# Patient Record
Sex: MALE | Race: WHITE | URBAN - METROPOLITAN AREA
[De-identification: names, ages, dates, MRNs, and addresses within clinical notes are randomized per-mention and may not be internally consistent; named-entity substitution may affect disease eponyms.]

---

## 2017-04-03 PROBLEM — 275978004 SCREENING FOR MALIGNANT NEOPLASM OF COLON: Status: ACTIVE | Noted: 2017-04-03

## 2021-10-26 ENCOUNTER — OFFICE VISIT (OUTPATIENT)
Dept: URBAN - METROPOLITAN AREA CLINIC 115 | Facility: CLINIC | Age: 66
End: 2021-10-26
Payer: COMMERCIAL

## 2021-10-26 ENCOUNTER — WEB ENCOUNTER (OUTPATIENT)
Dept: URBAN - METROPOLITAN AREA CLINIC 115 | Facility: CLINIC | Age: 66
End: 2021-10-26

## 2021-10-26 ENCOUNTER — DASHBOARD ENCOUNTERS (OUTPATIENT)
Age: 66
End: 2021-10-26

## 2021-10-26 VITALS
TEMPERATURE: 98.2 F | HEIGHT: 70 IN | HEART RATE: 86 BPM | SYSTOLIC BLOOD PRESSURE: 151 MMHG | WEIGHT: 209.8 LBS | DIASTOLIC BLOOD PRESSURE: 77 MMHG | BODY MASS INDEX: 30.03 KG/M2

## 2021-10-26 DIAGNOSIS — R13.19 ESOPHAGEAL DYSPHAGIA: ICD-10-CM

## 2021-10-26 PROCEDURE — 99204 OFFICE O/P NEW MOD 45 MIN: CPT | Performed by: INTERNAL MEDICINE

## 2021-10-26 RX ORDER — OMEPRAZOLE 20 MG/1
1 CAPSULE 30 MINUTES BEFORE MORNING MEAL CAPSULE, DELAYED RELEASE ORAL ONCE A DAY
Status: ACTIVE | COMMUNITY

## 2021-10-26 NOTE — HPI-OTHER HISTORIES
dysphagia solids, in chest, intermittent, denies gerd, sx improved sig w omep 20 daily, no prior egd, no abd pain

## 2021-11-11 ENCOUNTER — OFFICE VISIT (OUTPATIENT)
Dept: URBAN - METROPOLITAN AREA SURGERY CENTER 13 | Facility: SURGERY CENTER | Age: 66
End: 2021-11-11
Payer: COMMERCIAL

## 2021-11-11 DIAGNOSIS — K22.2 ACQUIRED ESOPHAGEAL RING: ICD-10-CM

## 2021-11-11 DIAGNOSIS — K29.60 ADENOPAPILLOMATOSIS GASTRICA: ICD-10-CM

## 2021-11-11 DIAGNOSIS — R13.19 CERVICAL DYSPHAGIA: ICD-10-CM

## 2021-11-11 DIAGNOSIS — K21.00 ALKALINE REFLUX ESOPHAGITIS: ICD-10-CM

## 2021-11-11 PROCEDURE — 43239 EGD BIOPSY SINGLE/MULTIPLE: CPT | Performed by: INTERNAL MEDICINE

## 2021-11-11 PROCEDURE — G8907 PT DOC NO EVENTS ON DISCHARG: HCPCS | Performed by: INTERNAL MEDICINE

## 2021-11-11 PROCEDURE — 43248 EGD GUIDE WIRE INSERTION: CPT | Performed by: INTERNAL MEDICINE

## 2021-11-11 RX ORDER — OMEPRAZOLE 20 MG/1
1 CAPSULE 30 MINUTES BEFORE MORNING MEAL CAPSULE, DELAYED RELEASE ORAL ONCE A DAY
Status: ACTIVE | COMMUNITY

## 2021-11-15 ENCOUNTER — OFFICE VISIT (OUTPATIENT)
Dept: URBAN - METROPOLITAN AREA CLINIC 27 | Facility: CLINIC | Age: 66
End: 2021-11-15

## 2021-11-30 ENCOUNTER — TELEPHONE ENCOUNTER (OUTPATIENT)
Dept: URBAN - METROPOLITAN AREA CLINIC 92 | Facility: CLINIC | Age: 66
End: 2021-11-30

## 2021-11-30 RX ORDER — OMEPRAZOLE 40 MG/1
1 CAPSULE 30 MINUTES BEFORE MORNING MEAL CAPSULE, DELAYED RELEASE ORAL ONCE A DAY
Qty: 30 | Refills: 1

## 2021-12-29 ENCOUNTER — OFFICE VISIT (OUTPATIENT)
Dept: URBAN - METROPOLITAN AREA SURGERY CENTER 13 | Facility: SURGERY CENTER | Age: 66
End: 2021-12-29

## 2022-01-04 ENCOUNTER — OFFICE VISIT (OUTPATIENT)
Dept: URBAN - METROPOLITAN AREA CLINIC 115 | Facility: CLINIC | Age: 67
End: 2022-01-04

## 2022-04-04 ENCOUNTER — ERX REFILL RESPONSE (OUTPATIENT)
Dept: URBAN - METROPOLITAN AREA CLINIC 82 | Facility: CLINIC | Age: 67
End: 2022-04-04

## 2022-04-04 RX ORDER — OMEPRAZOLE 40 MG/1
TAKE ONE CAPSULE BY MOUTH ONE TIME DAILY IN THE MORNING 30 MINUTES BEFORE MORNING MEAL CAPSULE, DELAYED RELEASE ORAL
Qty: 30 CAPSULE | Refills: 2 | OUTPATIENT

## 2022-04-30 ENCOUNTER — TELEPHONE ENCOUNTER (OUTPATIENT)
Dept: URBAN - METROPOLITAN AREA CLINIC 121 | Facility: CLINIC | Age: 67
End: 2022-04-30

## 2022-05-01 ENCOUNTER — TELEPHONE ENCOUNTER (OUTPATIENT)
Dept: URBAN - METROPOLITAN AREA CLINIC 121 | Facility: CLINIC | Age: 67
End: 2022-05-01

## 2022-05-01 RX ORDER — CETIRIZINE HYDROCHLORIDE 10 MG/1
TABLET, FILM COATED ORAL
Status: ACTIVE | COMMUNITY
Start: 2017-03-13

## 2022-05-01 RX ORDER — PRAVASTATIN SODIUM 20 MG/1
TABLET ORAL
Status: ACTIVE | COMMUNITY
Start: 2017-03-13

## 2023-05-23 ENCOUNTER — HOSPITAL ENCOUNTER (INPATIENT)
Facility: CLINIC | Age: 68
LOS: 2 days | Discharge: HOME | End: 2023-05-25
Attending: EMERGENCY MEDICINE | Admitting: INTERNAL MEDICINE
Payer: COMMERCIAL

## 2023-05-23 ENCOUNTER — APPOINTMENT (OUTPATIENT)
Dept: ULTRASOUND IMAGING | Facility: CLINIC | Age: 68
End: 2023-05-23
Attending: EMERGENCY MEDICINE
Payer: COMMERCIAL

## 2023-05-23 DIAGNOSIS — L03.114 CELLULITIS OF LEFT HAND: ICD-10-CM

## 2023-05-23 DIAGNOSIS — R21 RASH AND NONSPECIFIC SKIN ERUPTION: ICD-10-CM

## 2023-05-23 DIAGNOSIS — T23.202A: ICD-10-CM

## 2023-05-23 DIAGNOSIS — L40.9 PSORIASIS: Primary | ICD-10-CM

## 2023-05-23 DIAGNOSIS — L03.116 CELLULITIS OF LEFT LEG: ICD-10-CM

## 2023-05-23 LAB
ALBUMIN SERPL BCG-MCNC: 4 G/DL (ref 3.5–5.2)
ALP SERPL-CCNC: 86 U/L (ref 40–129)
ALT SERPL W P-5'-P-CCNC: 20 U/L (ref 10–50)
ANION GAP SERPL CALCULATED.3IONS-SCNC: 12 MMOL/L (ref 7–15)
AST SERPL W P-5'-P-CCNC: 22 U/L (ref 10–50)
BASOPHILS # BLD AUTO: 0.1 10E3/UL (ref 0–0.2)
BASOPHILS NFR BLD AUTO: 1 %
BILIRUB DIRECT SERPL-MCNC: <0.2 MG/DL (ref 0–0.3)
BILIRUB SERPL-MCNC: 0.3 MG/DL
BUN SERPL-MCNC: 20.1 MG/DL (ref 8–23)
CALCIUM SERPL-MCNC: 8.8 MG/DL (ref 8.8–10.2)
CHLORIDE SERPL-SCNC: 102 MMOL/L (ref 98–107)
CREAT SERPL-MCNC: 1.04 MG/DL (ref 0.67–1.17)
DEPRECATED HCO3 PLAS-SCNC: 22 MMOL/L (ref 22–29)
EOSINOPHIL # BLD AUTO: 0.2 10E3/UL (ref 0–0.7)
EOSINOPHIL NFR BLD AUTO: 2 %
ERYTHROCYTE [DISTWIDTH] IN BLOOD BY AUTOMATED COUNT: 15.3 % (ref 10–15)
GFR SERPL CREATININE-BSD FRML MDRD: 79 ML/MIN/1.73M2
GLUCOSE SERPL-MCNC: 176 MG/DL (ref 70–99)
HCT VFR BLD AUTO: 42.9 % (ref 40–53)
HGB BLD-MCNC: 14.2 G/DL (ref 13.3–17.7)
HOLD SPECIMEN: NORMAL
IMM GRANULOCYTES # BLD: 0.1 10E3/UL
IMM GRANULOCYTES NFR BLD: 1 %
LYMPHOCYTES # BLD AUTO: 1.8 10E3/UL (ref 0.8–5.3)
LYMPHOCYTES NFR BLD AUTO: 15 %
MCH RBC QN AUTO: 29.5 PG (ref 26.5–33)
MCHC RBC AUTO-ENTMCNC: 33.1 G/DL (ref 31.5–36.5)
MCV RBC AUTO: 89 FL (ref 78–100)
MONOCYTES # BLD AUTO: 0.5 10E3/UL (ref 0–1.3)
MONOCYTES NFR BLD AUTO: 5 %
NEUTROPHILS # BLD AUTO: 9 10E3/UL (ref 1.6–8.3)
NEUTROPHILS NFR BLD AUTO: 76 %
NRBC # BLD AUTO: 0 10E3/UL
NRBC BLD AUTO-RTO: 0 /100
PLATELET # BLD AUTO: 422 10E3/UL (ref 150–450)
POTASSIUM SERPL-SCNC: 4.1 MMOL/L (ref 3.4–5.3)
PROT SERPL-MCNC: 7 G/DL (ref 6.4–8.3)
RBC # BLD AUTO: 4.82 10E6/UL (ref 4.4–5.9)
SODIUM SERPL-SCNC: 136 MMOL/L (ref 136–145)
WBC # BLD AUTO: 11.6 10E3/UL (ref 4–11)

## 2023-05-23 PROCEDURE — 250N000011 HC RX IP 250 OP 636: Performed by: INTERNAL MEDICINE

## 2023-05-23 PROCEDURE — 250N000013 HC RX MED GY IP 250 OP 250 PS 637: Performed by: INTERNAL MEDICINE

## 2023-05-23 PROCEDURE — 85025 COMPLETE CBC W/AUTO DIFF WBC: CPT | Performed by: EMERGENCY MEDICINE

## 2023-05-23 PROCEDURE — 99285 EMERGENCY DEPT VISIT HI MDM: CPT | Mod: 25

## 2023-05-23 PROCEDURE — 82248 BILIRUBIN DIRECT: CPT | Performed by: EMERGENCY MEDICINE

## 2023-05-23 PROCEDURE — 87040 BLOOD CULTURE FOR BACTERIA: CPT | Performed by: EMERGENCY MEDICINE

## 2023-05-23 PROCEDURE — 80053 COMPREHEN METABOLIC PANEL: CPT | Performed by: EMERGENCY MEDICINE

## 2023-05-23 PROCEDURE — 99222 1ST HOSP IP/OBS MODERATE 55: CPT | Performed by: INTERNAL MEDICINE

## 2023-05-23 PROCEDURE — 120N000001 HC R&B MED SURG/OB

## 2023-05-23 PROCEDURE — 93971 EXTREMITY STUDY: CPT | Mod: LT

## 2023-05-23 PROCEDURE — 80048 BASIC METABOLIC PNL TOTAL CA: CPT | Performed by: EMERGENCY MEDICINE

## 2023-05-23 PROCEDURE — 250N000011 HC RX IP 250 OP 636: Performed by: EMERGENCY MEDICINE

## 2023-05-23 PROCEDURE — 36415 COLL VENOUS BLD VENIPUNCTURE: CPT | Performed by: EMERGENCY MEDICINE

## 2023-05-23 PROCEDURE — 258N000003 HC RX IP 258 OP 636: Performed by: EMERGENCY MEDICINE

## 2023-05-23 RX ORDER — POLYETHYLENE GLYCOL 3350 17 G/17G
17 POWDER, FOR SOLUTION ORAL DAILY PRN
Status: DISCONTINUED | OUTPATIENT
Start: 2023-05-23 | End: 2023-05-25 | Stop reason: HOSPADM

## 2023-05-23 RX ORDER — POLYETHYLENE GLYCOL 3350 17 G
2 POWDER IN PACKET (EA) ORAL
Status: DISCONTINUED | OUTPATIENT
Start: 2023-05-23 | End: 2023-05-25 | Stop reason: HOSPADM

## 2023-05-23 RX ORDER — CLINDAMYCIN PHOSPHATE 900 MG/50ML
900 INJECTION, SOLUTION INTRAVENOUS ONCE
Status: COMPLETED | OUTPATIENT
Start: 2023-05-23 | End: 2023-05-23

## 2023-05-23 RX ORDER — AMOXICILLIN 250 MG
2 CAPSULE ORAL 2 TIMES DAILY PRN
Status: DISCONTINUED | OUTPATIENT
Start: 2023-05-23 | End: 2023-05-25 | Stop reason: HOSPADM

## 2023-05-23 RX ORDER — SODIUM CHLORIDE 9 MG/ML
INJECTION, SOLUTION INTRAVENOUS CONTINUOUS
Status: DISCONTINUED | OUTPATIENT
Start: 2023-05-23 | End: 2023-05-23

## 2023-05-23 RX ORDER — CLINDAMYCIN PHOSPHATE 900 MG/50ML
900 INJECTION, SOLUTION INTRAVENOUS EVERY 8 HOURS
Status: DISCONTINUED | OUTPATIENT
Start: 2023-05-24 | End: 2023-05-24

## 2023-05-23 RX ORDER — ENOXAPARIN SODIUM 100 MG/ML
30 INJECTION SUBCUTANEOUS EVERY 24 HOURS
Status: DISCONTINUED | OUTPATIENT
Start: 2023-05-23 | End: 2023-05-24

## 2023-05-23 RX ORDER — KETOCONAZOLE 20 MG/G
CREAM TOPICAL 2 TIMES DAILY
Status: ON HOLD | COMMUNITY
End: 2023-05-24

## 2023-05-23 RX ORDER — LIDOCAINE 40 MG/G
CREAM TOPICAL
Status: DISCONTINUED | OUTPATIENT
Start: 2023-05-23 | End: 2023-05-25 | Stop reason: HOSPADM

## 2023-05-23 RX ORDER — AMOXICILLIN 250 MG
1 CAPSULE ORAL 2 TIMES DAILY PRN
Status: DISCONTINUED | OUTPATIENT
Start: 2023-05-23 | End: 2023-05-25 | Stop reason: HOSPADM

## 2023-05-23 RX ORDER — SULFAMETHOXAZOLE AND TRIMETHOPRIM 400; 80 MG/1; MG/1
1 TABLET ORAL 2 TIMES DAILY
Status: ON HOLD | COMMUNITY
End: 2023-05-24

## 2023-05-23 RX ADMIN — CLINDAMYCIN PHOSPHATE 900 MG: 900 INJECTION, SOLUTION INTRAVENOUS at 16:30

## 2023-05-23 RX ADMIN — CLINDAMYCIN PHOSPHATE 900 MG: 900 INJECTION, SOLUTION INTRAVENOUS at 23:42

## 2023-05-23 RX ADMIN — MICONAZOLE NITRATE: 20 POWDER TOPICAL at 20:32

## 2023-05-23 RX ADMIN — ENOXAPARIN SODIUM 30 MG: 30 INJECTION SUBCUTANEOUS at 19:26

## 2023-05-23 RX ADMIN — SODIUM CHLORIDE 1000 ML: 9 INJECTION, SOLUTION INTRAVENOUS at 16:30

## 2023-05-23 ASSESSMENT — ACTIVITIES OF DAILY LIVING (ADL)
ADLS_ACUITY_SCORE: 35
ADLS_ACUITY_SCORE: 32
ADLS_ACUITY_SCORE: 28
ADLS_ACUITY_SCORE: 29

## 2023-05-23 NOTE — ED NOTES
[facility]  ED Nurse Handoff Report    ED Chief complaint: Cellulitis  . ED Diagnosis:   Final diagnoses:   Cellulitis of left hand   Cellulitis of left leg   Rash and nonspecific skin eruption       Allergies: No Known Allergies    Code Status: Full Code    Activity level - Baseline/Home:  independent.  Activity Level - Current:   standby.   Lift room needed: No.   Bariatric: No   Needed: No   Isolation: Yes.   Infection: Other-contact- rash.     Respiratory status: Room air    Vital Signs (within 30 minutes):   Vitals:    05/23/23 1244 05/23/23 1633   BP: 126/86 136/80   Pulse: 100 64   Resp: 16 18   Temp: 97.6  F (36.4  C)    TempSrc: Temporal    SpO2: 96% 94%       Cardiac Rhythm:  ,      Pain level:    Patient confused: No.   Patient Falls Risk: patient and family education.   Elimination Status: Has voided     Patient Report - Initial Complaint: cellulitis.   Focused Assessment:  Geovany Cano is a 67 year old male who presents with cellulitis. He also complains of a rash to his left lower leg that developed around one week ago. He states that around the same time he developed several spots on his right lower leg. He also reports that he recently suffered a burn to the dorsal aspect of his left hand. He has applied ointments to his left hand. He reports that he was evaluated six days ago and was prescribed Bactrim which he has been taking. He denies any history of DVT, PE, or psoriasis. He states that he drinks one beer per month and no other alcoholic beverages.     Independent Historian:   None - Patient Only      Medications:    Ketoconazole 2% topical cream  Trimethoprim-sulfamethoxazole     Past Medical History:    Neck mass     Past Surgical History:    Lymph node biopsy, neck, bilateral  Eye surgery     Physical Exam      Patient Vitals for the past 24 hrs:    BP Temp Temp src Pulse Resp SpO2   05/23/23 1244 126/86 97.6  F (36.4  C) Temporal 100 16 96 %         Physical  Exam  Constitutional: Patient is well appearing. No distress.  Head: Atraumatic.  Mouth/Throat: Oropharynx is clear and moist. No oropharyngeal exudate.  Eyes: Conjunctivae and EOM are normal. No scleral icterus.  Neck: Normal range of motion. Neck supple.   Cardiovascular: Normal rate, regular rhythm, normal heart sounds and intact distal perfusion.   Pulmonary/Chest: Breath sounds normal. No respiratory distress.  Abdominal: Soft. Bowel sounds are normal. No distension. No tenderness. No rebound or guarding.   Musculoskeletal: Normal range of motion. No edema or tenderness. All joints with free and painless range of motion.  Neurological: Alert and orientated to person, place, and time. No observable focal neuro deficit  Skin: Left lower leg from the knee down diffuse erythematous skin with desquamation and cracking. Left hand rash at the knuckles with swelling of the dorsum of hand. Left hand cracking at knuckles with swelling of dorsum of hand. Warm and dry. No rash noted. Not diaphoretic.     Abnormal Results:   Labs Ordered and Resulted from Time of ED Arrival to Time of ED Departure   BASIC METABOLIC PANEL - Abnormal       Result Value    Sodium 136      Potassium 4.1      Chloride 102      Carbon Dioxide (CO2) 22      Anion Gap 12      Urea Nitrogen 20.1      Creatinine 1.04      Calcium 8.8      Glucose 176 (*)     GFR Estimate 79     CBC WITH PLATELETS AND DIFFERENTIAL - Abnormal    WBC Count 11.6 (*)     RBC Count 4.82      Hemoglobin 14.2      Hematocrit 42.9      MCV 89      MCH 29.5      MCHC 33.1      RDW 15.3 (*)     Platelet Count 422      % Neutrophils 76      % Lymphocytes 15      % Monocytes 5      % Eosinophils 2      % Basophils 1      % Immature Granulocytes 1      NRBCs per 100 WBC 0      Absolute Neutrophils 9.0 (*)     Absolute Lymphocytes 1.8      Absolute Monocytes 0.5      Absolute Eosinophils 0.2      Absolute Basophils 0.1      Absolute Immature Granulocytes 0.1      Absolute NRBCs 0.0      HEPATIC FUNCTION PANEL - Normal    Protein Total 7.0      Albumin 4.0      Bilirubin Total 0.3      Alkaline Phosphatase 86      AST 22      ALT 20      Bilirubin Direct <0.20     BLOOD CULTURE        US Lower Extremity Venous Duplex Left    (Results Pending)       Treatments provided: IV abx, IVF  Family Comments: wife at bedside  OBS brochure/video discussed/provided to patient:  Yes  ED Medications:   Medications   clindamycin (CLEOCIN) 900 mg in 50 mL D5W intermittent infusion (900 mg Intravenous $New Bag 5/23/23 1630)   0.9% sodium chloride BOLUS (1,000 mLs Intravenous $New Bag 5/23/23 1630)     Followed by   sodium chloride 0.9% infusion (has no administration in time range)       Drips infusing:  Yes  For the majority of the shift this patient was Green.   Interventions performed were NA.    Sepsis treatment initiated: No    Cares/treatment/interventions/medications to be completed following ED care: NA    ED Nurse Name: Татьяна Flores RN  4:41 PM  RECEIVING UNIT ED HANDOFF REVIEW    Above ED Nurse Handoff Report was reviewed: Yes  Reviewed by: Theresa Forte RN on May 23, 2023 at 5:22 PM

## 2023-05-23 NOTE — H&P
[facility]    History and Physical - Hospitalist Service       Date of Admission:  5/23/2023  Primary Care Physician   No primary care provider on file.  [facility]  CONSULTANTS: None    Assessment & Plan      Geovany Cano is a 67 year old male admitted on 5/23/2023 for worsening swelling and cellulitis of the left hand, left leg, with a diffuse macular papular rash.    1.  Cellulitis:   Patient injured his left hand pouring coffee on it leading to a burn and now has developed cellulitis of the left hand with cracking of the skin, swelling, and redness.  He also has desquamation, redness, tenderness, and swelling of the left lower extremity.  He is already being treated with a few days of Keflex which was transitioned into Bactrim over the last 3 days.  Symptoms have gotten worse so he has been started on IV clindamycin which I will continue.  He also has an ultrasound pending of the left lower extremity to rule out DVT and fluid collection.  May need to consider adding IV vancomycin to cover MRSA.  I did outline redness of his lower extremity.  If not improving should consider getting a biopsy.    2.  Diffuse macular papular rash: Patient has a rash over his entire body which actually started prior to him seeing his clinic a week ago.  So I do not think that this is related to his antibiotics and I doubt that this is a allergic reaction.  He denies taking any other medications, herbs, or supplements.  Does appear to be at different stages of healing but I do not see any evidence of vesicles.  This could represent Guttate Psoriasis though would be uncommon in an adult.  Patient's been started on IV antibiotics.  I still think we should consider possibly doing a skin biopsy.  We will leave this up to the day rounding team as if the patient is doing better may not need it.    3.  Intertrigo: Patient has been on ketoconazole cream at home for the last week.  At this point I  will start the patient on Miconazole powder as well to make sure the area stays dry and not moist.  Could consider starting the patient on Diflucan if not improving.      4.  Tobacco dependence: Discussed with the patient cessation of smoking.  His wife also smokes.  He smokes a pack a day.  We will start him on nicotine gum as needed.    4Ms:  Polypharmacy: Not on any medications chronically  Mentation:   Capacity intact making his own medical decisions  Social support: Patient is in his second marriage for the last 27 years has 2 biological children and 2 stepchildren.  Works as a manager at a truck spot.  Mobility: Is not use any walkers or canes  What is importmant: To be as independent as possible.  Does not like taking medications.  Has concerns about his longevity due to his family history.  Not want a go through what his family members went through in the past    Discussed plan of care with wife at bedside, Dr. Kaplan in the emergency room  Diet:  Regular  DVT Prophylaxis: Enoxaparin (Lovenox) SQ  Haynes Catheter: Not present  Lines: None     Cardiac Monitoring: None    RESTRAINTS: Not indicated  Code Status:  Full        65  MINUTES SPENT BY ME on the date of service doing chart review, history, exam, documentation & further activities per the note.          This document was created using voice recognition technology.  Please excuse any typographical errors that may have occurred.  Please call with any questions.        Disposition Plan Patient is a started IV antibiotics with the hope that his cellulitis improves.  However the patient may require general surgery consultation for possible skin biopsy     Expected Discharge Date: 05/26/2023                  Mikey Rizvi MD  Hospitalist Service  [facility]  Securely message with [employer] (more info)  Text page via [employer] Paging/Directory     Length of stay: Anticipate greater than 2 midnight hospitalization for evaluation and  treatment of severe cellulitis requiring IV antibiotics.          ______________________________________________________________________    Chief Complaint   Left leg and left hand redness and swelling    History is obtained from the patient with his wife bedside    History of Present Illness   Geovany Cano is a 67 year old male admitted on 5/23/2023. He is not on any chronic medications and has not been diagnosed with any chronic medical conditions.  He was first seen in clinic 1 week ago after developing left lower leg redness and swelling along with the left hand on infection as well.  Patient also had a start of the diffuse macular papular rash over his entire body with his soles spared.  He has no history of rashes before.  He was started on Keflex originally and then transition to Bactrim for possible cellulitis of his left lower leg as well of his left hand.  His left hand injury started as a burn.  There is concern that the patient could have guttate psoriasis.  In 2021 the patient was worked up for cervical lymphadenopathy and had a battery of antibodies/antigen sent.  He was negative for ANCA, ESTRELLA, double-stranded DNA, Ro, Dayna.  Patient states that his swelling and tenderness has gotten worse so came to the emergency room.  He states that he has some tenderness with walking.  Has been working as a manager.  Denies any fever, chills, sweats, no nausea, vomiting.  Has no shortness of breath.  States that his body rash is actually itchy.  Cannot tell me if he ever had the chickenpox.  Thinks he had the measles as a child.      Past Medical History    Past Medical History:   Diagnosis Date     Cervical lymphadenopathy     LN biopsy and escision negative,ruled out cancer       Past Surgical History   Past Surgical History:   Procedure Laterality Date     IR LYMPH NODE BIOPSY  4/1/2021       Prior to Admission Medications   Prior to Admission Medications   Prescriptions Last Dose Informant Patient Reported?  Taking?   ketoconazole (NIZORAL) 2 % external cream Unknown  Yes Yes   Sig: Apply topically 2 times daily To arm pits   sulfamethoxazole-trimethoprim (BACTRIM) 400-80 MG tablet 2023  Yes Yes   Sig: Take 1 tablet by mouth 2 times daily      Facility-Administered Medications: None        Social History   I have reviewed this patient's social history and updated it with pertinent information if needed.  Social History     Tobacco Use     Smoking status: Every Day     Packs/day: 1.00     Types: Cigarettes   Substance Use Topics     Alcohol use: Yes       Family History   I have reviewed this patient's family history and updated it with pertinent information if needed.  Family History   Problem Relation Age of Onset     Lung Cancer Mother          60s     Chronic Obstructive Pulmonary Disease Father      Lung Cancer Brother        Allergies   No Known Allergies     REVIEW OF SYSTEMS:  A comprehensive review of systems was negative except for items noted in the HPI.  Patient currently denies any fever, chills, sweats, nausea, vomiting, diarrhea, shortness of breath, or chest pain.   Patient's rash is itchy.      Physical Exam   Vital Signs: Temp: 97.6  F (36.4  C) Temp src: Temporal BP: 136/80 Pulse: 64   Resp: 18 SpO2: 94 %      Weight: 0 lbs 0 oz    General appearance: Patient is alert and oriented x3, no apparent distress, pleasant and conversing normally, speaking in full sentences, appears stated age  HEENT:  Atraumatic/normal cephalic, EOMI, Pupils equally round and reactive to light, sclera non-icteric, Mucous membranes are moist  NECK:  supple without bruit or lymphadenopathy  RESPIRATORY: Clear to auscultation bilateral, good air movement  CARDIOVASCULAR: Regular rate and rhythm, normal S1/S2, no murmurs, rubs, or gallops present, peripheral pulses intact  GASTROINTESTINAL: Non-distended, non-tender, soft, bowel sounds present throughout, no mass or hepatosplenomegaly  MUSCULOSKELETAL: without deformity,  normal range of motion  SKIN:  Warm, dry, has a diffuse macular papular rash at different stages of healing over his entire body with his face spared as well as his heels, has desquamation of his left lower leg as well as his left hand, has intertrigo of both armpits  NEUROLOGIC:  Cranial nerves II-XII intact, without any focal deficits, strength 5/5 throughout  EXTREMITIES:  Moves all extremities, no clubbing, cyanosis, has swelling of both his left lower extremity as well as his left hand associated with some redness, tenderness, and desquamation  :  Ivy not present         Data     I have personally reviewed the following data over the past 24 hrs:    11.6 (H)  \   14.2   / 422     136 102 20.1 /  176 (H)   4.1 22 1.04 \       ALT: 20 AST: 22 AP: 86 TBILI: 0.3   ALB: 4.0 TOT PROTEIN: 7.0 LIPASE: N/A       Imaging:   No results found for this or any previous visit.  Procedures: Venous ultrasound of the left lower extremity pending 5/23/2023    I have personally have reviewed the patient's most up to date rlabs, orders, and medications myself

## 2023-05-23 NOTE — ED TRIAGE NOTES
Patient sent to the ED from clinic with worsening cellulitis. Patient states he was seen at clinic last Tuesday for cellulitis of left leg and left hand. Started on antibiotics. Antibiotics were changed Friday following the culture results. Patient states redness has continued to spread.

## 2023-05-23 NOTE — ED PROVIDER NOTES
"  History     Chief Complaint:  Cellulitis       The history is provided by the patient.      Geovany Cano is a 67 year old male who presents with cellulitis. He also complains of a rash to his left lower leg that developed around one week ago. He states that around the same time he developed several spots on his right lower leg. He also reports that he recently suffered a burn to the dorsal aspect of his left hand. He has applied ointments to his left hand. He reports that he was evaluated six days ago and was prescribed Bactrim which he has been taking. He denies any history of DVT, PE, or psoriasis. He states that he drinks one beer per month and no other alcoholic beverages.    Independent Historian:   None - Patient Only    Review of External Notes:       Medications:    Ketoconazole 2% topical cream  Trimethoprim-sulfamethoxazole    Past Medical History:    Neck mass    Past Surgical History:    Lymph node biopsy, neck, bilateral  Eye surgery     Physical Exam     Patient Vitals for the past 24 hrs:   BP Temp Temp src Pulse Resp SpO2 Height Weight   05/23/23 1807 135/78 97.8  F (36.6  C) Oral 82 16 97 % 1.753 m (5' 9\") 88.7 kg (195 lb 8.8 oz)   05/23/23 1633 136/80 -- -- 64 18 94 % -- --   05/23/23 1244 126/86 97.6  F (36.4  C) Temporal 100 16 96 % -- --        Physical Exam  Constitutional: Patient is well appearing. No distress. Not toxic  Head: Atraumatic.  Mouth/Throat: Oropharynx is clear and moist. No oropharyngeal exudate.   Eyes: Conjunctivae and EOM are normal. No scleral icterus.  Neck: Normal range of motion. Neck supple.   Cardiovascular: Normal rate, regular rhythm, normal heart sounds and intact distal perfusion.   Pulmonary/Chest: Breath sounds normal. No respiratory distress.  Abdominal: Soft. Bowel sounds are normal. No distension. No tenderness. No rebound or guarding.   Musculoskeletal: Normal range of motion. No edema or tenderness. All joints with free and painless range of " motion.  Neurological: Alert and orientated to person, place, and time. No observable focal neuro deficit  Skin: Left lower leg from the knee down diffuse erythematous skin with desquamation and cracking. Left hand rash at the knuckles with swelling of the dorsum of hand. Left hand cracking at knuckles with swelling of dorsum of hand. Warm and dry. No rash noted. Not diaphoretic.     Emergency Department Course     Imaging:  US Lower Extremity Venous Duplex Left    (Results Pending)      Report per radiology    Laboratory:  Labs Ordered and Resulted from Time of ED Arrival to Time of ED Departure   BASIC METABOLIC PANEL - Abnormal       Result Value    Sodium 136      Potassium 4.1      Chloride 102      Carbon Dioxide (CO2) 22      Anion Gap 12      Urea Nitrogen 20.1      Creatinine 1.04      Calcium 8.8      Glucose 176 (*)     GFR Estimate 79     CBC WITH PLATELETS AND DIFFERENTIAL - Abnormal    WBC Count 11.6 (*)     RBC Count 4.82      Hemoglobin 14.2      Hematocrit 42.9      MCV 89      MCH 29.5      MCHC 33.1      RDW 15.3 (*)     Platelet Count 422      % Neutrophils 76      % Lymphocytes 15      % Monocytes 5      % Eosinophils 2      % Basophils 1      % Immature Granulocytes 1      NRBCs per 100 WBC 0      Absolute Neutrophils 9.0 (*)     Absolute Lymphocytes 1.8      Absolute Monocytes 0.5      Absolute Eosinophils 0.2      Absolute Basophils 0.1      Absolute Immature Granulocytes 0.1      Absolute NRBCs 0.0     HEPATIC FUNCTION PANEL - Normal    Protein Total 7.0      Albumin 4.0      Bilirubin Total 0.3      Alkaline Phosphatase 86      AST 22      ALT 20      Bilirubin Direct <0.20     BLOOD CULTURE      Emergency Department Course & Assessments:    Interventions:  Medications   clindamycin (CLEOCIN) 900 mg in 50 mL D5W intermittent infusion (has no administration in time range)   lidocaine 1 % 0.1-1 mL (has no administration in time range)   lidocaine (LMX4) cream (has no administration in time  range)   sodium chloride (PF) 0.9% PF flush 3 mL (3 mLs Intracatheter $Given 5/23/23 1841)   sodium chloride (PF) 0.9% PF flush 3 mL (has no administration in time range)   melatonin tablet 1 mg (has no administration in time range)   enoxaparin ANTICOAGULANT (LOVENOX) injection 30 mg (30 mg Subcutaneous $Given 5/23/23 1926)   senna-docusate (SENOKOT-S/PERICOLACE) 8.6-50 MG per tablet 1 tablet (has no administration in time range)     Or   senna-docusate (SENOKOT-S/PERICOLACE) 8.6-50 MG per tablet 2 tablet (has no administration in time range)   polyethylene glycol (MIRALAX) Packet 17 g (has no administration in time range)   nicotine (COMMIT) lozenge 2 mg (has no administration in time range)   miconazole (MICATIN) 2 % powder ( Topical $Given 5/23/23 2032)   clindamycin (CLEOCIN) 900 mg in 50 mL D5W intermittent infusion (900 mg Intravenous $New Bag 5/23/23 1630)   0.9% sodium chloride BOLUS (1,000 mLs Intravenous $New Bag 5/23/23 1630)        Assessments:  1535 I obtained history and examined the patient.    Independent Interpretation (X-rays, CTs, rhythm strip):  None    Consultations/Discussion of Management or Tests:  1620 I spoke on the phone with Dr. Mikey Rizvi, Hospitalist.     Social Determinants of Health affecting care:       Disposition:  The patient was admitted to the hospital under the care of Dr. Mikey Rizvi.     Impression & Plan      Medical Decision Making:  Geovany Cano is a 67 year old male who presents for evaluation of skin redness. The history, physical exam and supporting data are consistent with cellulitis. There do not appear at this time to be any complication of cellulitis including necrotizing fascitis, lymphangitis, lymphadenitis, abscess, osteomyelitis, sepsis, or shock. The patient is not immunosuppressed. Patient has failed outpatient management. Laboratory studies today show mildly elevated white blood cell count. Patient is started on IV antibiotics as  above. Plan will be to admit patient to the hospitalist for further IV therapy and evaluation.    Diagnosis:    ICD-10-CM    1. Cellulitis of left hand  L03.114       2. Cellulitis of left leg  L03.116       3. Rash and nonspecific skin eruption  R21            Scribe Disclosure:  Lisa GAMA B.S. am serving as a scribe at 3:39 PM on 5/23/2023 to document services personally performed by Michael Kaplan MD based on my observations and the provider's statements to me.     Due to hospital and departmental capacity constraints and prolonged wait times, this patient was evaluated in non-traditional circumstances such as in triage/waiting room, a hallway, etc. I explained the option to wait for a traditional treatment space and apologized for the inconvenience. Given the circumstances, every attempt was made to provide for the patient's comfort and privacy and to perform the most thorough evaluation possible.       Michael Kaplan MD  05/23/23 1147

## 2023-05-23 NOTE — PHARMACY-ADMISSION MEDICATION HISTORY
Pharmacist Admission Medication History    Admission medication history is complete. The information provided in this note is only as accurate as the sources available at the time of the update.    Medication reconciliation/reorder completed by provider prior to medication history? No    Information Source(s): Patient and Clinic records via in-person    Pertinent Information:     Changes made to PTA medication list:    Added: None    Deleted: None    Changed: None    Medication Affordability:       Allergies reviewed with patient and updates made in EHR: yes    Medication History Completed By: Mira Vann RPH 5/23/2023 5:18 PM    Prior to Admission medications    Medication Sig Last Dose Taking? Auth Provider Long Term End Date   ketoconazole (NIZORAL) 2 % external cream Apply topically 2 times daily To arm pits Unknown Yes Reported, Patient     sulfamethoxazole-trimethoprim (BACTRIM) 400-80 MG tablet Take 1 tablet by mouth 2 times daily 5/23/2023 Yes Reported, Patient

## 2023-05-24 LAB
BASOPHILS # BLD AUTO: 0.1 10E3/UL (ref 0–0.2)
BASOPHILS NFR BLD AUTO: 1 %
EOSINOPHIL # BLD AUTO: 0.3 10E3/UL (ref 0–0.7)
EOSINOPHIL NFR BLD AUTO: 4 %
ERYTHROCYTE [DISTWIDTH] IN BLOOD BY AUTOMATED COUNT: 15.2 % (ref 10–15)
HCT VFR BLD AUTO: 39.8 % (ref 40–53)
HGB BLD-MCNC: 13.5 G/DL (ref 13.3–17.7)
IMM GRANULOCYTES # BLD: 0.1 10E3/UL
IMM GRANULOCYTES NFR BLD: 1 %
LYMPHOCYTES # BLD AUTO: 1.8 10E3/UL (ref 0.8–5.3)
LYMPHOCYTES NFR BLD AUTO: 20 %
MCH RBC QN AUTO: 29.7 PG (ref 26.5–33)
MCHC RBC AUTO-ENTMCNC: 33.9 G/DL (ref 31.5–36.5)
MCV RBC AUTO: 88 FL (ref 78–100)
MONOCYTES # BLD AUTO: 0.7 10E3/UL (ref 0–1.3)
MONOCYTES NFR BLD AUTO: 8 %
NEUTROPHILS # BLD AUTO: 6.2 10E3/UL (ref 1.6–8.3)
NEUTROPHILS NFR BLD AUTO: 66 %
NRBC # BLD AUTO: 0 10E3/UL
NRBC BLD AUTO-RTO: 0 /100
PLATELET # BLD AUTO: 390 10E3/UL (ref 150–450)
RBC # BLD AUTO: 4.54 10E6/UL (ref 4.4–5.9)
WBC # BLD AUTO: 9.2 10E3/UL (ref 4–11)

## 2023-05-24 PROCEDURE — 99233 SBSQ HOSP IP/OBS HIGH 50: CPT | Performed by: HOSPITALIST

## 2023-05-24 PROCEDURE — 85025 COMPLETE CBC W/AUTO DIFF WBC: CPT | Performed by: HOSPITALIST

## 2023-05-24 PROCEDURE — G0463 HOSPITAL OUTPT CLINIC VISIT: HCPCS

## 2023-05-24 PROCEDURE — 120N000001 HC R&B MED SURG/OB

## 2023-05-24 PROCEDURE — 86060 ANTISTREPTOLYSIN O TITER: CPT | Performed by: HOSPITALIST

## 2023-05-24 PROCEDURE — 250N000011 HC RX IP 250 OP 636: Performed by: HOSPITALIST

## 2023-05-24 PROCEDURE — 36415 COLL VENOUS BLD VENIPUNCTURE: CPT | Performed by: HOSPITALIST

## 2023-05-24 PROCEDURE — 250N000011 HC RX IP 250 OP 636

## 2023-05-24 PROCEDURE — 250N000011 HC RX IP 250 OP 636: Performed by: INTERNAL MEDICINE

## 2023-05-24 PROCEDURE — 250N000013 HC RX MED GY IP 250 OP 250 PS 637: Performed by: INTERNAL MEDICINE

## 2023-05-24 RX ORDER — DESONIDE 0.5 MG/G
OINTMENT TOPICAL 2 TIMES DAILY
Status: DISCONTINUED | OUTPATIENT
Start: 2023-05-24 | End: 2023-05-25 | Stop reason: HOSPADM

## 2023-05-24 RX ORDER — DESONIDE 0.5 MG/G
OINTMENT TOPICAL 2 TIMES DAILY
Status: DISCONTINUED | OUTPATIENT
Start: 2023-05-24 | End: 2023-05-24

## 2023-05-24 RX ORDER — DESONIDE 0.5 MG/G
OINTMENT TOPICAL 2 TIMES DAILY
Qty: 15 G | Refills: 0 | Status: SHIPPED | OUTPATIENT
Start: 2023-05-24

## 2023-05-24 RX ORDER — TRIAMCINOLONE ACETONIDE 1 MG/G
CREAM TOPICAL 2 TIMES DAILY
Status: DISCONTINUED | OUTPATIENT
Start: 2023-05-24 | End: 2023-05-24

## 2023-05-24 RX ORDER — TRIAMCINOLONE ACETONIDE 1 MG/G
CREAM TOPICAL 2 TIMES DAILY
Qty: 453.6 G | Refills: 0 | Status: SHIPPED | OUTPATIENT
Start: 2023-05-24

## 2023-05-24 RX ORDER — ENOXAPARIN SODIUM 100 MG/ML
40 INJECTION SUBCUTANEOUS EVERY 24 HOURS
Status: DISCONTINUED | OUTPATIENT
Start: 2023-05-24 | End: 2023-05-25 | Stop reason: HOSPADM

## 2023-05-24 RX ORDER — AMPICILLIN AND SULBACTAM 2; 1 G/1; G/1
3 INJECTION, POWDER, FOR SOLUTION INTRAMUSCULAR; INTRAVENOUS EVERY 6 HOURS
Status: DISCONTINUED | OUTPATIENT
Start: 2023-05-24 | End: 2023-05-25 | Stop reason: HOSPADM

## 2023-05-24 RX ORDER — TRIAMCINOLONE ACETONIDE 1 MG/G
CREAM TOPICAL 2 TIMES DAILY
Status: DISCONTINUED | OUTPATIENT
Start: 2023-05-24 | End: 2023-05-25 | Stop reason: HOSPADM

## 2023-05-24 RX ORDER — DIPHENHYDRAMINE HCL 25 MG
25 CAPSULE ORAL EVERY 6 HOURS PRN
Status: DISCONTINUED | OUTPATIENT
Start: 2023-05-24 | End: 2023-05-25 | Stop reason: HOSPADM

## 2023-05-24 RX ADMIN — DIPHENHYDRAMINE HYDROCHLORIDE 25 MG: 25 CAPSULE ORAL at 00:33

## 2023-05-24 RX ADMIN — AMPICILLIN SODIUM AND SULBACTAM SODIUM 3 G: 2; 1 INJECTION, POWDER, FOR SOLUTION INTRAMUSCULAR; INTRAVENOUS at 13:43

## 2023-05-24 RX ADMIN — DESONIDE: 0.5 OINTMENT TOPICAL at 19:54

## 2023-05-24 RX ADMIN — MICONAZOLE NITRATE: 20 POWDER TOPICAL at 08:10

## 2023-05-24 RX ADMIN — AMPICILLIN SODIUM AND SULBACTAM SODIUM 3 G: 2; 1 INJECTION, POWDER, FOR SOLUTION INTRAMUSCULAR; INTRAVENOUS at 09:09

## 2023-05-24 RX ADMIN — ENOXAPARIN SODIUM 40 MG: 40 INJECTION SUBCUTANEOUS at 18:44

## 2023-05-24 RX ADMIN — DIPHENHYDRAMINE HYDROCHLORIDE 25 MG: 25 CAPSULE ORAL at 08:10

## 2023-05-24 RX ADMIN — AMPICILLIN SODIUM AND SULBACTAM SODIUM 3 G: 2; 1 INJECTION, POWDER, FOR SOLUTION INTRAMUSCULAR; INTRAVENOUS at 19:48

## 2023-05-24 RX ADMIN — TRIAMCINOLONE ACETONIDE: 1 CREAM TOPICAL at 13:24

## 2023-05-24 RX ADMIN — DESONIDE: 0.5 OINTMENT TOPICAL at 13:25

## 2023-05-24 RX ADMIN — TRIAMCINOLONE ACETONIDE: 1 CREAM TOPICAL at 19:54

## 2023-05-24 RX ADMIN — DIPHENHYDRAMINE HYDROCHLORIDE 25 MG: 25 CAPSULE ORAL at 20:54

## 2023-05-24 ASSESSMENT — ACTIVITIES OF DAILY LIVING (ADL)
ADLS_ACUITY_SCORE: 29

## 2023-05-24 NOTE — PROGRESS NOTES
"[facility]    Medicine Progress Note - Hospitalist Service    Date of Admission:  5/23/2023    Assessment & Plan   Geovany Cano is a 67 year old male with history of tobacco use admitted on 5/23/2023 with left lower extremity cellulitis and underlying dermatitis. 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     Left lower extremity cellulitis    - had cultures done at his clinic on 5/16 which grew acinetobacter baumannii    - had been switched from keflex to bactrim    - started on clinda on this admission which I have changed to Unasyn    - seen by wound care who have placed their recs    Guttate psoriasis    - undiagnosed    - will check ASO titer    - started noticing \"patches\" a few weeks ago    - ordered desonide 0.05% for aixillae    - triamcinolone 0.1% cream for chest/torso/legs/arms (avoid open skin areas)    - will need to see derm as outpt. Should stop all steroid creams 2 weeks prior to that appt in case they want a biopsy    Smoker    - nicotine lozenge ordered    - ok for patient to walk outside on sidewalk with wife    Called and left a message for his wife     Diet: Combination Diet Regular Diet Adult    DVT Prophylaxis: Low Risk/Ambulatory with no VTE prophylaxis indicated  Haynes Catheter: Not present  Lines: None     Cardiac Monitoring: None  Code Status: Full Code      Clinically Significant Risk Factors Present on Admission                       # Overweight: Estimated body mass index is 28.88 kg/m  as calculated from the following:    Height as of this encounter: 1.753 m (5' 9\").    Weight as of this encounter: 88.7 kg (195 lb 8.8 oz).            Disposition Plan      Expected Discharge Date: 05/25/2023    Discharge Delays: *Early Discharge Anticipated              Alex Mas MD  Hospitalist Service  [facility]  Securely message with [employer] (more info)  Text page via [employer] Paging/Directory "   ______________________________________________________________________    Interval History   Patient eager to get home. States his rash looks the same. No chest pain, sob, abdo pain, n/v/d.     Physical Exam   Vital Signs: Temp: 97.8  F (36.6  C) Temp src: Oral BP: 138/75 Pulse: 75   Resp: 16 SpO2: 94 % O2 Device: None (Room air)    Weight: 195 lbs 8.77 oz    Constitutional: awake, alert, cooperative, no apparent distress, and appears stated age  Eyes: Lids and lashes normal, pupils equal, round and reactive to light, extra ocular muscles intact, sclera clear, conjunctiva normal  ENT: Normocephalic, without obvious abnormality, atraumatic, sinuses nontender on palpation, external ears without lesions, oral pharynx with moist mucous membranes, tonsils without erythema or exudates, gums normal and good dentition.  Respiratory: No increased work of breathing, good air exchange, clear to auscultation bilaterally, no crackles or wheezing  Cardiovascular: Normal apical impulse, regular rate and rhythm, normal S1 and S2, no S3 or S4, and no murmur noted  Skin: I examined all the skin of his legs, thighs, torso, head, neck, arms, hands. I placed photos in his chart    Medical Decision Making       40 MINUTES SPENT BY ME on the date of service doing chart review, history, exam, documentation & further activities per the note.      Data     I have personally reviewed the following data over the past 24 hrs:    9.2  \   13.5   / 390     N/A N/A N/A /  N/A   N/A N/A N/A \       ALT: N/A AST: N/A AP: N/A TBILI: N/A   ALB: N/A TOT PROTEIN: N/A LIPASE: N/A       Imaging results reviewed over the past 24 hrs:   Recent Results (from the past 24 hour(s))   US Lower Extremity Venous Duplex Left    Narrative    EXAM: US LOWER EXTREMITY VENOUS DUPLEX LEFT  LOCATION: [facility]  DATE/TIME: 5/23/2023 5:52 PM CDT    INDICATION: Left leg swelling    COMPARISON: None.  TECHNIQUE: Venous Duplex ultrasound of the left  lower extremity with and without compression, augmentation and duplex. Color flow and spectral Doppler with waveform analysis performed.    FINDINGS: Exam includes the common femoral, femoral, popliteal, and contralateral common femoral veins as well as segmentally visualized deep calf veins and greater saphenous vein.     LEFT: No deep vein thrombosis. No superficial thrombophlebitis. No popliteal cyst.      Impression    IMPRESSION:  1.  No deep venous thrombosis in the left lower extremity.

## 2023-05-24 NOTE — PROGRESS NOTES
"Inpatient Progress Note [id number]    Patient is A/Ox4, on room air, up independently. VSS. On a regular diet and tolerating it well. He ambulated outside with his wife (this was okay with service). Patient was switched from IV clindamycin to IV ampicillin q6h for cellulitis. Wound consult was placed and new wound orders have been placed- see WOC note for specific cares relating Left hand and Leg left. Patient reports minimal pain in left leg when walking, but otherwise remains comfortable. On lovenox q24h.     BP (!) 148/88 (BP Location: Right arm)   Pulse 70   Temp 97.6  F (36.4  C) (Oral)   Resp 18   Ht 1.753 m (5' 9\")   Wt 88.7 kg (195 lb 8.8 oz)   SpO2 98%   BMI 28.88 kg/m      "

## 2023-05-24 NOTE — PROVIDER NOTIFICATION
Paged x-cover provider:  pt has an all over body rash (since prior to admission) that is very itchy, can he have anything for itch please?   thanks,   Dayan VO

## 2023-05-24 NOTE — PLAN OF CARE
ROOM # 202-1    Living Situation (if not independent, order SW consult): Home  Fac[facility] name:  : [facility]    Activity level at baseline: Independent  Activity level on admit: Standby assist of one    Who will be transporting you at discharge: [facility]    Patient registered to observation; given Patient Bill of Rights; given the opportunity to ask questions about observation status and their plan of care.  Patient has been oriented to the observation room, bathroom and call light is in place-yes.    Discussed discharge goals and expectations with patient/family-yes.

## 2023-05-24 NOTE — CONSULTS
[facility] Nurse Inpatient Assessment     Consulted for: Left hand, Left leg    Patient History (according to provider note(s):      Geovany Cano is a 67 year old male admitted on 5/23/2023. He is not on any chronic medications and has not been diagnosed with any chronic medical conditions.  He was first seen in clinic 1 week ago after developing left lower leg redness and swelling along with the left hand on infection as well.  Patient also had a start of the diffuse macular papular rash over his entire body with his soles spared.  He has no history of rashes before.  He was started on Keflex originally and then transition to Bactrim for possible cellulitis of his left lower leg as well of his left hand.  His left hand injury started as a burn.  There is concern that the patient could have guttate psoriasis.     Assessment:      Areas visualized during today's visit: Focused: Left hand, left leg    Wound location: Left hand  Last photo: 5/24/23    Wound due to: Burn  Wound history/plan of care: Burn injury from spilling hot coffee on hand.  Wound base: 100 % red mostly dry tissue     Palpation of the wound bed: normal      Drainage: none     Description of drainage: none     Measurements (length x width x depth, in cm): 1  x 4  x  0.2 cm      Tunneling: N/A     Undermining: N/A  Periwound skin: Dry/scaly      Color: normal and consistent with surrounding tissue      Temperature: normal   Odor: none  Pain: mild  Pain interventions prior to dressing change: patient tolerated well  Treatment goal: Heal  and Increase moisture   STATUS: initial assessment  Supplies ordered: supplies stored on unit and discussed with RN     Wound location: Left leg  Last photo: 5/24/23    Wound due to: Cellulitis   Wound history/plan of care: Patient with new cellulitis to left leg, patient started on oral antibiotics last week but area continued to get worse.  Patient also believed to have newly diagnosed  guttate psoriasis contributing.    Wound base: Patient with scattered areas of dermis largest of 0.5x3x0.1cm with surrounding erythema and dry cracked skin extending from below knee with above ankle.  Areas extend to calf but are not fully circumferential.       Palpation of the wound bed: normal      Drainage: small     Description of drainage: serosanguinous     Measurements (length x width x depth, in cm): see above      Tunneling: N/A     Undermining: N/A  Periwound skin: Dry/scaly and Erythema- blanchable      Color: pink      Temperature: warm  Odor: none  Pain: mild  Pain interventions prior to dressing change: slow and gentle cares   Treatment goal: Heal  and Infection control/prevention  STATUS: initial assessment  Supplies ordered: ordered Vashe, edemawear         Treatment Plan:     Left hand wound(s): BID   1. Cleanse with Vashe and dry  2. Apply Sween cream to open areas and surrounding skin  3. Cover with adaptic and wrap with kerlix     Left leg wound(s): Daily   1. Cleanse with Vashe ([facility]#[id number])  2. Wrap with Vashe moistened kerlix for 20 minutes and then remove  3. Apply Sween cream to wounds and leg  4. Cover any draining areas with adaptic and dry gauze  5. Wrap with kerlix    May need compression in a couple days as redness decreases, explained use of Edemawear to patient.    Orders: Written    RECOMMEND PRIMARY TEAM ORDER: None, at this time  Education provided: plan of care  Discussed plan of care with: Patient, Nurse and Physician  WOC nurse follow-up plan: weekly  Notify WOC if wound(s) deteriorate.  Nursing to notify the Provider(s) and re-consult the WOC Nurse if new skin concern.    DATA:     Current support surface: Standard  Standard gel/foam mattress (IsoFlex, Atmos air, etc)  Containment of urine/stool: Continent of bladder and Continent of bowel  BMI: Body mass index is 28.88 kg/m .   Active diet order: Orders Placed This Encounter      Combination Diet Regular Diet Adult     Output:  I/O last 3 completed shifts:  In: 120 [P.O.:120]  Out: -      Labs: Recent Labs   Lab 05/24/23  0954 05/23/23  1257   ALBUMIN  --  4.0   HGB 13.5 14.2   WBC 9.2 11.6*     Pressure injury risk assessment:   Sensory Perception: 3-->slightly limited  Moisture: 4-->rarely moist  Activity: 3-->walks occasionally  Mobility: 3-->slightly limited  Nutrition: 3-->adequate  Friction and Shear: 3-->no apparent problem  Gordo Score: 19    JANELLE Arevalo [facility] [employer]  Dept. Office Number: [phone]

## 2023-05-24 NOTE — PLAN OF CARE
Inpatient note [id number]  Vital Signs: WDL, pt is on RA.   Pain/Comfort: rates pain 8/10, but only when up & walking on leg. Declines pain meds. Elevated l) leg on 2 pillows to reduce edema.   Diet/po intake: +po intake  Output: voiding in bathroom  Activity/Ambulation: up SBA  Pertinent assessments: left lower leg w/ peeling, cracking, slight oozing from cracks, +3 edema to leg & foot. L) hand w/ cellulitis & edema, cracking at knuckles. All over rash w/ various stages of healing, pt reports it is very itchy, received benedryl.   Major Shift Events: none  Plan (Upcoming Events): pt will need IV antibx q8hrs, has home w/ wife. May possibly need skin consult for all over body rash, day team to assess & plan.   Discharge/Transfer Needs: unknown at this time.     Will continue with POC.

## 2023-05-24 NOTE — DISCHARGE INSTRUCTIONS
Left hand wound(s): Twice a day  1. Cleanse with Vashe and dry  2. Apply Sween cream (or Vaseline) to open areas and surrounding skin  3. Cover with adaptic and wrap with kerlix     Left leg wound(s): Daily   Current plan:  1. Cleanse with Vashe   2. Wrap with Vashe moistened kerlix for 20 minutes and then remove  3. Apply Sween cream (or Vaseline) to wounds and leg  4. Cover any draining areas with adaptic and dry gauze  5. Wrap with kerlix  Once redness decreases:  1. Cleanse with Vashe   2. Wrap with Vashe moistened kerlix for 20 minutes and then remove  3. Apply Sween cream (or Vaseline) to wounds and leg  4. Apply Edemawear to leg from foot to below knee (for compression)  5. If areas draining wrap leg with kerlix after applying Edemawear  6. If Edemawear becomes soiled hand wash and let air dry, one pair good for 6 months

## 2023-05-24 NOTE — PLAN OF CARE
"IN PATIENT NOTE: 1810-2300  Diagnosis: Cellulitis Left hand/leg/Rash and Nonspecific Skin Eruption    /78 (BP Location: Right arm)   Pulse 82   Temp 97.8  F (36.6  C) (Oral)   Resp 16   Ht 1.753 m (5' 9\")   Wt 88.7 kg (195 lb 8.8 oz)   SpO2 97%   BMI 28.88 kg/m       Pt A & O X 4. On regular diet, tolerated well. Denies any pain. PIV access saline locked. On IV Clindamycin Q 8 hours for cellulitis. On Lovenox subcutaneous. Vital signs stable.     Plan: Continue IV antibiotics, foe smoking cessation consult. Will continue to provide supportive cares.     "

## 2023-05-25 VITALS
HEIGHT: 69 IN | WEIGHT: 195.55 LBS | BODY MASS INDEX: 28.96 KG/M2 | SYSTOLIC BLOOD PRESSURE: 125 MMHG | OXYGEN SATURATION: 98 % | RESPIRATION RATE: 16 BRPM | TEMPERATURE: 97.9 F | HEART RATE: 76 BPM | DIASTOLIC BLOOD PRESSURE: 81 MMHG

## 2023-05-25 LAB
ASO AB SERPL-ACNC: <55 IU/ML
BASOPHILS # BLD AUTO: 0.1 10E3/UL (ref 0–0.2)
BASOPHILS NFR BLD AUTO: 1 %
EOSINOPHIL # BLD AUTO: 0.4 10E3/UL (ref 0–0.7)
EOSINOPHIL NFR BLD AUTO: 4 %
ERYTHROCYTE [DISTWIDTH] IN BLOOD BY AUTOMATED COUNT: 15.1 % (ref 10–15)
HCT VFR BLD AUTO: 40.8 % (ref 40–53)
HGB BLD-MCNC: 13.8 G/DL (ref 13.3–17.7)
IMM GRANULOCYTES # BLD: 0.1 10E3/UL
IMM GRANULOCYTES NFR BLD: 1 %
LYMPHOCYTES # BLD AUTO: 2.2 10E3/UL (ref 0.8–5.3)
LYMPHOCYTES NFR BLD AUTO: 21 %
MCH RBC QN AUTO: 29.8 PG (ref 26.5–33)
MCHC RBC AUTO-ENTMCNC: 33.8 G/DL (ref 31.5–36.5)
MCV RBC AUTO: 88 FL (ref 78–100)
MONOCYTES # BLD AUTO: 1.1 10E3/UL (ref 0–1.3)
MONOCYTES NFR BLD AUTO: 10 %
NEUTROPHILS # BLD AUTO: 6.7 10E3/UL (ref 1.6–8.3)
NEUTROPHILS NFR BLD AUTO: 63 %
NRBC # BLD AUTO: 0 10E3/UL
NRBC BLD AUTO-RTO: 0 /100
PLATELET # BLD AUTO: 403 10E3/UL (ref 150–450)
RBC # BLD AUTO: 4.63 10E6/UL (ref 4.4–5.9)
WBC # BLD AUTO: 10.6 10E3/UL (ref 4–11)

## 2023-05-25 PROCEDURE — 250N000011 HC RX IP 250 OP 636: Performed by: HOSPITALIST

## 2023-05-25 PROCEDURE — 85004 AUTOMATED DIFF WBC COUNT: CPT | Performed by: HOSPITALIST

## 2023-05-25 PROCEDURE — 36415 COLL VENOUS BLD VENIPUNCTURE: CPT | Performed by: HOSPITALIST

## 2023-05-25 PROCEDURE — 99239 HOSP IP/OBS DSCHRG MGMT >30: CPT | Performed by: HOSPITALIST

## 2023-05-25 RX ORDER — SULFAMETHOXAZOLE AND TRIMETHOPRIM 400; 80 MG/1; MG/1
1 TABLET ORAL 2 TIMES DAILY
Qty: 10 TABLET | Refills: 0 | Status: SHIPPED | OUTPATIENT
Start: 2023-05-25 | End: 2023-05-30

## 2023-05-25 RX ORDER — SULFAMETHOXAZOLE AND TRIMETHOPRIM 400; 80 MG/1; MG/1
1 TABLET ORAL 2 TIMES DAILY
Status: ON HOLD | COMMUNITY
End: 2023-05-25

## 2023-05-25 RX ORDER — DESONIDE 0.5 MG/G
OINTMENT TOPICAL 2 TIMES DAILY
Qty: 15 G | Refills: 0 | Status: SHIPPED | OUTPATIENT
Start: 2023-05-25

## 2023-05-25 RX ADMIN — AMPICILLIN SODIUM AND SULBACTAM SODIUM 3 G: 2; 1 INJECTION, POWDER, FOR SOLUTION INTRAMUSCULAR; INTRAVENOUS at 02:17

## 2023-05-25 RX ADMIN — DESONIDE: 0.5 OINTMENT TOPICAL at 07:58

## 2023-05-25 RX ADMIN — TRIAMCINOLONE ACETONIDE: 1 CREAM TOPICAL at 07:58

## 2023-05-25 RX ADMIN — AMPICILLIN SODIUM AND SULBACTAM SODIUM 3 G: 2; 1 INJECTION, POWDER, FOR SOLUTION INTRAMUSCULAR; INTRAVENOUS at 07:57

## 2023-05-25 ASSESSMENT — ACTIVITIES OF DAILY LIVING (ADL)
ADLS_ACUITY_SCORE: 29

## 2023-05-25 NOTE — PROGRESS NOTES
"  /85 (BP Location: Right arm)   Pulse 71   Temp 98  F (36.7  C) (Oral)   Resp 16   Ht 1.753 m (5' 9\")   Wt 88.7 kg (195 lb 8.8 oz)   SpO2 95%   BMI 28.88 kg/m      Pt is A&Ox4. VSS on RA. Pt denies pain unless he is walking. IV Unasyn q6 hours. Regular diet. Pt is independent in room. Kenalog cream applied for rash all over body. PRN Benadryl given for itching. Desonide ointment applied to axillary area. L lower leg wrapped in dressing done by WOC. Dressing is clean, dry and intact.   "

## 2023-05-25 NOTE — DISCHARGE SUMMARY
"[facility]  Hospitalist Discharge Summary      Date of Admission:  5/23/2023  Date of Discharge:  5/25/2023  Discharging Provider: Alex Mas MD  Discharge Service: Hospitalist Service    Discharge Diagnoses   Left lower extremity cellulitis  Guttate psoriasis    Clinically Significant Risk Factors     # Overweight: Estimated body mass index is 28.88 kg/m  as calculated from the following:    Height as of this encounter: 1.753 m (5' 9\").    Weight as of this encounter: 88.7 kg (195 lb 8.8 oz).       Follow-ups Needed After Discharge   Follow-up Appointments     Follow-up and recommended labs and tests       Follow up with primary care provider, [facility], within 7   days for hospital follow- up.  The following labs/tests are recommended:   referral to dermatology. Re-assess cellulitis.             Unresulted Labs Ordered in the Past 30 Days of this Admission     Date and Time Order Name Status Description    5/24/2023  1:14 PM Streptolysin O Antibody (ASO) In process     5/23/2023  3:46 PM Blood Culture Peripheral Blood Preliminary       These results will be followed up by NA    Discharge Disposition   Discharged to home  Condition at discharge: Stable    Hospital Course   Geovany Cano is a 67 year old male admitted on 5/23/2023. He is not on any chronic medications and has not been diagnosed with any chronic medical conditions.  He was first seen in clinic 1 week ago after developing left lower leg redness and swelling along with the left hand on infection as well.  Patient also had a start of the diffuse macular papular rash over his entire body with his soles spared.  He has no history of rashes before.  He was started on Keflex originally and then transition to Bactrim for possible cellulitis of his left lower leg as well of his left hand.  His left hand injury started as a burn.  There is concern that the patient could have guttate psoriasis.  In 2021 the patient was " "worked up for cervical lymphadenopathy and had a battery of antibodies/antigen sent.  He was negative for ANCA, ESTRELLA, double-stranded DNA, Ro, Dayna.  Patient states that his swelling and tenderness has gotten worse so came to the emergency room.  He states that he has some tenderness with walking.  Has been working as a manager.  Denies any fever, chills, sweats, no nausea, vomiting.  Has no shortness of breath.  States that his body rash is actually itchy.  Cannot tell me if he ever had the chickenpox.  Thinks he had the measles as a child    Left lower extremity cellulitis    - had cultures done at his clinic on 5/16 which grew acinetobacter baumannii    - had been switched from keflex to bactrim    - started on clinda on this admission which I have changed to Unasyn    - seen by wound care who have placed their recs     Guttate psoriasis    - undiagnosed    - will check ASO titer    - started noticing \"patches\" a few weeks ago    - ordered desonide 0.05% for aixillae    - triamcinolone 0.1% cream for chest/torso/legs/arms (avoid open skin areas)    - will need to see derm as outpt. Should stop all steroid creams 2 weeks prior to that appt in case they want a biopsy     Smoker    - nicotine lozenge ordered    - ok for patient to walk outside on sidewalk with wife     Patient is doing well today.  He has no new complaints.  His left lower extremity seems slightly better today.  He is able to discharge home.  He has about 3 days left of the Bactrim course that he was on.  I would have him continue this and then add another 5 days of Bactrim.  He has been prescribed steroid creams for his rash which is likely psoriasis.  He has been told to keep his leg elevated.  He has been referred to dermatology.  He will also speak to his [facility] provider for a referral to dermatology and will follow-up with whichever one he can get in to see first.  He has been told to stop his steroid creams 2 weeks prior to his dermatology " appointment.  This is in case they need a biopsy.  Patient will discharge home.    Consultations This Hospital Stay   SMOKING CESSATION PROGRAM IP CONSULT  WOUND OSTOMY CONTINENCE NURSE  IP CONSULT    Code Status   Full Code    Time Spent on this Encounter   I, Alex Mas MD, personally saw the patient today and spent greater than 30 minutes discharging this patient.       Alex Mas MD  [facility] OBSERVATION DEPT  [address] [facility] [place] [address]  Phone: [phone]  ______________________________________________________________________    Physical Exam   Vital Signs: Temp: 98.2  F (36.8  C) Temp src: Oral BP: 133/73 Pulse: 70   Resp: 16 SpO2: 94 % O2 Device: None (Room air)    Weight: 195 lbs 8.77 oz  Constitutional: awake, alert, cooperative, no apparent distress, and appears stated age  Eyes: Lids and lashes normal, pupils equal, round and reactive to light, extra ocular muscles intact, sclera clear, conjunctiva normal  ENT: Normocephalic, without obvious abnormality, atraumatic, sinuses nontender on palpation, external ears without lesions, oral pharynx with moist mucous membranes, tonsils without erythema or exudates, gums normal and good dentition.  Skin: rashes notes. Area of LLE cellulitis appears slightly improved       Primary Care Physician   [facility]    Discharge Orders      Adult Dermatology Referral      Reason for your hospital stay    Cellulitis (soft tissue infection)  Guttate psoriasis     Follow-up and recommended labs and tests     Follow up with primary care provider, [facility], within 7 days for hospital follow- up.  The following labs/tests are recommended: referral to dermatology. Re-assess cellulitis.     Activity    Your activity upon discharge: activity as tolerated     Diet    Follow this diet upon discharge: Orders Placed This Encounter      Combination Diet Regular Diet Adult       Significant Results and Procedures    Most Recent 3 CBC's:Recent Labs   Lab Test 05/25/23  0508 05/24/23  0954 05/23/23  1257   WBC 10.6 9.2 11.6*   HGB 13.8 13.5 14.2   MCV 88 88 89    390 422     Most Recent 3 BMP's:Recent Labs   Lab Test 05/23/23  1257      POTASSIUM 4.1   CHLORIDE 102   CO2 22   BUN 20.1   CR 1.04   ANIONGAP 12   YE 8.8   *     Most Recent 2 LFT's:Recent Labs   Lab Test 05/23/23  1257   AST 22   ALT 20   ALKPHOS 86   BILITOTAL 0.3   ,   Results for orders placed or performed during the hospital encounter of 05/23/23   US Lower Extremity Venous Duplex Left    Narrative    EXAM: US LOWER EXTREMITY VENOUS DUPLEX LEFT  LOCATION: [facility]  DATE/TIME: 5/23/2023 5:52 PM CDT    INDICATION: Left leg swelling    COMPARISON: None.  TECHNIQUE: Venous Duplex ultrasound of the left lower extremity with and without compression, augmentation and duplex. Color flow and spectral Doppler with waveform analysis performed.    FINDINGS: Exam includes the common femoral, femoral, popliteal, and contralateral common femoral veins as well as segmentally visualized deep calf veins and greater saphenous vein.     LEFT: No deep vein thrombosis. No superficial thrombophlebitis. No popliteal cyst.      Impression    IMPRESSION:  1.  No deep venous thrombosis in the left lower extremity.       Discharge Medications   Current Discharge Medication List      START taking these medications    Details   !! desonide (DESOWEN) 0.05 % external ointment Apply topically 2 times daily  Qty: 15 g, Refills: 0    Associated Diagnoses: Psoriasis      !! desonide (DESOWEN) 0.05 % external ointment Apply topically 2 times daily To axillae  Qty: 15 g, Refills: 0    Associated Diagnoses: Psoriasis      triamcinolone (KENALOG) 0.1 % external cream Apply topically 2 times daily To chest, torso, leg rash. Avoid areas of broken skin  Qty: 453.6 g, Refills: 0    Associated Diagnoses: Psoriasis       !! - Potential duplicate medications  found. Please discuss with provider.      CONTINUE these medications which have CHANGED    Details   sulfamethoxazole-trimethoprim (BACTRIM) 400-80 MG tablet Take 1 tablet by mouth 2 times daily for 5 days  Qty: 10 tablet, Refills: 0    Comments: Finish your previous course of Bactrim and then complete this prescription  Associated Diagnoses: Cellulitis of left leg         STOP taking these medications       ketoconazole (NIZORAL) 2 % external cream Comments:   Reason for Stopping:             Allergies   No Known Allergies

## 2023-05-25 NOTE — PLAN OF CARE
Goal Outcome Evaluation: Patient discharged home with his wife.    Patient's After Visit Summary was reviewed with patient.   Patient verbalized understanding of After Visit Summary, recommended follow up and was given an opportunity to ask questions.   Discharge medications sent home with patient/family: YES, desonide ointment and kenalog cream. Other medications sent to [facility] pharmacy (Bactrim and additional desonide ointment and kenalog cream)   Discharged with spouse             OBSERVATION patient END time: 10:08

## 2023-05-28 LAB — BACTERIA BLD CULT: NO GROWTH
